# Patient Record
(demographics unavailable — no encounter records)

---

## 2017-03-08 NOTE — REP
Lumbar spine series:  Five views:

 

History:  Low back pain with numbness.  No comparison radiographs.

 

Findings:  Lumbar vertebral body heights are preserved and alignment is normal.

There is degenerative disc narrowing at L2-3 and to a lesser extent at L3-4.

Mild discogenic spurring is seen at L3-4 and L2-3.  Pedicles and posterior

elements are intact.  No fracture or collapse is seen.  No bony destructive

lesion is appreciated.  Sacrum and SI joints are intact.  Psoas margins are

symmetric.

 

Impression:

 

Degenerative disc changes at L2-3 and L3-4.  No acute bony abnormality.

 

 

Signed by

Rosalio Lozano MD 03/08/2017 05:45 P